# Patient Record
Sex: MALE | HISPANIC OR LATINO | Employment: OTHER | URBAN - METROPOLITAN AREA
[De-identification: names, ages, dates, MRNs, and addresses within clinical notes are randomized per-mention and may not be internally consistent; named-entity substitution may affect disease eponyms.]

---

## 2018-11-19 ENCOUNTER — OFFICE VISIT (OUTPATIENT)
Dept: URGENT CARE | Facility: CLINIC | Age: 43
End: 2018-11-19

## 2018-11-19 VITALS
HEIGHT: 67 IN | WEIGHT: 195 LBS | BODY MASS INDEX: 30.61 KG/M2 | HEART RATE: 81 BPM | OXYGEN SATURATION: 97 % | RESPIRATION RATE: 18 BRPM | TEMPERATURE: 97 F | SYSTOLIC BLOOD PRESSURE: 124 MMHG | DIASTOLIC BLOOD PRESSURE: 76 MMHG

## 2018-11-19 DIAGNOSIS — S05.01XA ABRASION OF RIGHT CORNEA, INITIAL ENCOUNTER: Primary | ICD-10-CM

## 2018-11-19 PROCEDURE — 99203 OFFICE O/P NEW LOW 30 MIN: CPT | Mod: S$GLB,,, | Performed by: PHYSICIAN ASSISTANT

## 2018-11-19 RX ORDER — POLYMYXIN B SULFATE AND TRIMETHOPRIM 1; 10000 MG/ML; [USP'U]/ML
1 SOLUTION OPHTHALMIC EVERY 6 HOURS
Qty: 10 ML | Refills: 0 | Status: SHIPPED | OUTPATIENT
Start: 2018-11-19

## 2018-11-19 NOTE — PATIENT INSTRUCTIONS
Abrasión Corneana [Corneal Abrasion]    Ha recibido un rasguño  o almas raspadura (abrasión) en la córnea. La córnea es la parte transparente en la parte frontal del rich. Cuando se produce almas lesión en esta ant tan sensible, suele ser muy dolorosa. Es posible que el rich esté lloroso y que la visión sea borrosa hasta que la lesión sane. También es posible que tenga sensibilidad a la annalise.  Esta parte del cuerpo juliana con rapidez. Puede esperar que el dolor desaparezca en las próximas 24 a 48 horas. Si la abrasión es oscar o profunda, zuniga médico puede colocarle almas venda sobre el rich, aunque no es lo que se suele hacer siempre. También es posible que le den gotas para los ojos (eye drops) o almas pomada con antibiótico (antibiotic ointment) para evitar que se infecte.  Quizás le coloquen gotas anestésicas (numbing drops) para aliviar el dolor temporalmente, a fin de que el médico pueda examinarle los ojos. Sin embargo, no pueden recetarle esas gotas para que las use en zuniga casa, porque esto evitaría la sanación de la herida y llevaría a problemas mayores. Además, si usted no puede sentir el rich, existe la posibilidad de que se lo lastime aún más por accidente sin siquiera darse cuenta.     Cuidados En [place]:  Puede aplicar almas compresa fría (hielo en almas bolsa plástica, envuelta en almas toalla) sobre el rich (o sobre la venda del rich) sonja 20 minutos cada vez, para aliviar el dolor.  · Puede usar acetaminofén (acetaminophen) o ibuprofeno (ibuprofen) para controlar el dolor, a menos que le hayan recetado otro medicamento. Nota: Si tiene almas enfermedad hepática o renal crónica (chronic liver or kidney disease), o ha tenido alguna vez almas úlcera estomacal (stomach ulcer) o sangrado gastrointestinal (GI bleeding), consulte con zuniga médico antes de blu estos medicamentos.  · Descanse los ojos y no radha hasta que los síntomas hayan desaparecido.  · Si usa lentes de contacto, no se los coloque hasta que los síntomas hayan  desaparecido.  · Si no ve zonia debido a la abrasión corneana, o si le duckworth colocado almas venda sobre el rich, no conduzca ningún vehículo motorizado ni opere ningún tipo de máquina hasta que todos los síntomas hayan desaparecido. Ello se debe a que podría tener problemas para calcular las distancias usando sólo un rich.  · Si tiene los ojos sensibles a la annalise, intente usar lentes de sol o permanezca en interiores hasta que los síntomas hayan desaparecido.  Seguimiento  Michaela almas visita de control a zuniga proveedor de atención médica, o según le hayan indicado.     · Si no le colocaron almas venda, deniz el dolor continúa por más de 48 horas, deberán hacerle otro examen. Regrese a lizeth centro o comuníquese con el proveedor de atención médica al que lo duckworth remitido para coordinarlo.  · Si le vendaron el rich y le pidieron que se sacara la venda usted mismo, consulte a zuniga médico o regrese a lizeth centro si sigue sintiendo dolor después de haberse quitado la venda.  · Si le dieron almas minh para que regrese a que le quiten la venda y vuelvan a examinarle el rich, no falte. Podría hacerle daño dejarse la venda puesta por más tiempo del que le duckworth indicado.  Busque Atención Médica  Llame a zuniga proveedor de atención médica si algo de lo siguiente ocurre:  · Mayor dolor en el rich, o el dolor no se espinoza después de 24 horas.  · Secreción en el rich.  · Mayor enrojecimiento del rich o hinchazón de los párpados.  · Zuniga visión empeora.  · Síntomas que empeoran después de que se ha curado la lesión  Date Last Reviewed: 6/14/2015  © 8226-5175 [employer]. [address] [place] [address], [place] [address]. Todos los derechos reservados. Esta información no pretende sustituir la atención médica profesional. Sólo zuniga médico puede diagnosticar y tratar un problema de alex.      Please follow up with your Primary care provider within 2-5 days if your signs and symptoms have not resolved or worsen.     If your condition worsens or fails to improve  we recommend that you receive another evaluation at the emergency room immediately or contact your primary medical clinic to discuss your concerns.   You must understand that you have received an Urgent Care treatment only and that you may be released before all of your medical problems are known or treated. You, the patient, will arrange for follow up care as instructed.     RED FLAGS/WARNING SYMPTOMS DISCUSSED WITH PATIENT THAT WOULD WARRANT EMERGENT MEDICAL ATTENTION. PATIENT VERBALIZED UNDERSTANDING.

## 2018-11-19 NOTE — PROGRESS NOTES
"Subjective:       Patient ID: Sherwin Jacobs is a 43 y.o. male.    Vitals:  height is 5' 7" (1.702 m) and weight is 88.5 kg (195 lb). His temperature is 97 °F (36.1 °C). His blood pressure is 124/76 and his pulse is 81. His respiration is 18 and oxygen saturation is 97%.     Chief Complaint: Eye Problem    Eye Problem    The right eye is affected. This is a new problem. The current episode started today. The problem occurs constantly. The problem has been unchanged. The injury mechanism was a foreign body. The pain is at a severity of 8/10. The pain is moderate. There is no known exposure to pink eye. He does not wear contacts. Pertinent negatives include no blurred vision, eye discharge, double vision, eye redness, fever, itching, nausea, photophobia or vomiting. He has tried eye drops and water for the symptoms. The treatment provided no relief.       Constitution: Negative for chills and fever.   HENT: Negative for congestion and sinus pain.    Eyes: Positive for foreign body in eye and eye pain. Negative for eye trauma, eye discharge, eye itching, eye redness, photophobia, vision loss, double vision, blurred vision and eyelid swelling.   Gastrointestinal: Negative for nausea and vomiting.   Skin: Negative for rash.   Allergic/Immunologic: Negative for seasonal allergies and itching.   Neurological: Negative for headaches.       Objective:      Physical Exam   Constitutional: He is oriented to person, place, and time. He appears well-developed and well-nourished.   HENT:   Head: Normocephalic and atraumatic.   Right Ear: External ear normal.   Left Ear: External ear normal.   Nose: Nose normal.   Mouth/Throat: Oropharynx is clear and moist.   Eyes: Conjunctivae, EOM and lids are normal. Pupils are equal, round, and reactive to light. Lids are everted and swept, no foreign bodies found. Right eye exhibits no chemosis, no discharge, no exudate and no hordeolum. No foreign body present in the right eye. Left eye " exhibits no chemosis, no discharge, no exudate and no hordeolum. No foreign body present in the left eye. No scleral icterus.       Small corneal abrasion noted. No signs of FB, ulcer, or flare;     Examined by Reuben Strange as well. No FB noted; Flushed using stephanie lens with 1000mLs of NS. Ointment and patch applied   Neck: Trachea normal, full passive range of motion without pain and phonation normal. Neck supple.   Musculoskeletal: Normal range of motion.   Neurological: He is alert and oriented to person, place, and time.   Skin: Skin is warm, dry and intact.   Psychiatric: He has a normal mood and affect. His speech is normal and behavior is normal. Judgment and thought content normal. Cognition and memory are normal.   Nursing note and vitals reviewed.      Assessment:       1. Abrasion of right cornea, initial encounter        Plan:         Abrasion of right cornea, initial encounter  -     polymyxin B sulf-trimethoprim (POLYTRIM) 10,000 unit- 1 mg/mL Drop; Place 1 drop into the right eye every 6 (six) hours.  Dispense: 10 mL; Refill: 0      Abrasión Corneana [Corneal Abrasion]    Ha recibido un rasguño  o almas raspadura (abrasión) en la córnea. La córnea es la parte transparente en la parte frontal del rich. Cuando se produce almas lesión en esta ant tan sensible, suele ser muy dolorosa. Es posible que el rich esté lloroso y que la visión sea borrosa hasta que la lesión sane. También es posible que tenga sensibilidad a la annalise.  Esta parte del cuerpo juliana con rapidez. Puede esperar que el dolor desaparezca en las próximas 24 a 48 horas. Si la abrasión es oscar o profunda, zuniga médico puede colocarle almas venda sobre el rich, aunque no es lo que se suele hacer siempre. También es posible que le den gotas para los ojos (eye drops) o almas pomada con antibiótico (antibiotic ointment) para evitar que se infecte.  Quizás le coloquen gotas anestésicas (numbing drops) para aliviar el dolor temporalmente, a fin de que el  médico pueda examinarle los ojos. Sin embargo, no pueden recetarle esas gotas para que las use en zuniga casa, porque esto evitaría la sanación de la herida y llevaría a problemas mayores. Además, si usted no puede sentir el rich, existe la posibilidad de que se lo lastime aún más por accidente sin siquiera darse cuenta.     Cuidados En [place]:  Puede aplicar almas compresa fría (hielo en almas bolsa plástica, envuelta en almas toalla) sobre el rich (o sobre la venda del rich) sonja 20 minutos cada vez, para aliviar el dolor.  · Puede usar acetaminofén (acetaminophen) o ibuprofeno (ibuprofen) para controlar el dolor, a menos que le hayan recetado otro medicamento. Nota: Si tiene almas enfermedad hepática o renal crónica (chronic liver or kidney disease), o ha tenido alguna vez almas úlcera estomacal (stomach ulcer) o sangrado gastrointestinal (GI bleeding), consulte con zuniga médico antes de blu estos medicamentos.  · Descanse los ojos y no radha hasta que los síntomas hayan desaparecido.  · Si usa lentes de contacto, no se los coloque hasta que los síntomas hayan desaparecido.  · Si no ve zonia debido a la abrasión corneana, o si le duckworth colocado almas venda sobre el rich, no conduzca ningún vehículo motorizado ni opere ningún tipo de máquina hasta que todos los síntomas hayan desaparecido. Ello se debe a que podría tener problemas para calcular las distancias usando sólo un rich.  · Si tiene los ojos sensibles a la annalise, intente usar lentes de sol o permanezca en interiores hasta que los síntomas hayan desaparecido.  Seguimiento  Michaela almas visita de control a zuniga proveedor de atención médica, o según le hayan indicado.     · Si no le colocaron almas venda, deniz el dolor continúa por más de 48 horas, deberán hacerle otro examen. Regrese a lizeth centro o comuníquese con el proveedor de atención médica al que lo duckworth remitido para coordinarlo.  · Si le vendaron el rich y le pidieron que se sacara la venda usted mismo, consulte a zuniga médico o  regrese a lizeth centro si sigue sintiendo dolor después de haberse quitado la venda.  · Si le dieron almas minh para que regrese a que le quiten la venda y vuelvan a examinarle el rich, no falte. Podría hacerle daño dejarse la venda puesta por más tiempo del que le duckworth indicado.  Busque Atención Médica  Llame a zuniga proveedor de atención médica si algo de lo siguiente ocurre:  · Mayor dolor en el rich, o el dolor no se espinoza después de 24 horas.  · Secreción en el rich.  · Mayor enrojecimiento del rich o hinchazón de los párpados.  · Zuniga visión empeora.  · Síntomas que empeoran después de que se ha curado la lesión  Date Last Reviewed: 6/14/2015 © 2000-2017 [employer]. [address] [place] [address]. Todos los derechos reservados. Esta información no pretende sustituir la atención médica profesional. Sólo zuniga médico puede diagnosticar y tratar un problema de alex.      Please follow up with your Primary care provider within 2-5 days if your signs and symptoms have not resolved or worsen.     If your condition worsens or fails to improve we recommend that you receive another evaluation at the emergency room immediately or contact your primary medical clinic to discuss your concerns.   You must understand that you have received an Urgent Care treatment only and that you may be released before all of your medical problems are known or treated. You, the patient, will arrange for follow up care as instructed.     RED FLAGS/WARNING SYMPTOMS DISCUSSED WITH PATIENT THAT WOULD WARRANT EMERGENT MEDICAL ATTENTION. PATIENT VERBALIZED UNDERSTANDING.